# Patient Record
Sex: FEMALE | Race: WHITE | NOT HISPANIC OR LATINO
[De-identification: names, ages, dates, MRNs, and addresses within clinical notes are randomized per-mention and may not be internally consistent; named-entity substitution may affect disease eponyms.]

---

## 2020-07-08 PROBLEM — Z00.00 ENCOUNTER FOR PREVENTIVE HEALTH EXAMINATION: Status: ACTIVE | Noted: 2020-07-08

## 2022-11-29 ENCOUNTER — APPOINTMENT (OUTPATIENT)
Dept: GASTROENTEROLOGY | Facility: CLINIC | Age: 82
End: 2022-11-29

## 2022-11-29 PROCEDURE — 99443: CPT | Mod: 95

## 2022-11-29 NOTE — ASSESSMENT
[FreeTextEntry1] : recurrent inflammatory disease of the llq, and now still completing her abx with Augmentin\par 2.  dysphagia or hesitation of food as it is swallowed, several months, probably inflammatory dx of esophagus or a stricture\par 3.  long term prednisone use, 5 mgm per day\par \par plan;\par patient will be finishing up her antibiotic regimen\par patient may be allergic to cipro or flagyl\par dysphagia needs to be further evaluated with an egd, \par also, at about six to eight weeks following this recent inflammatory process, a colonoscopy will be needed\par but [facility] advise an egd earlier, so we don’t have to wait\par \par More than 50% of the face to face time was devoted to counseling and /or coordination of care.  THis coordination of care may have included reviewing other medical notes and reports, and communicating with other health professionals\par \par also, advised patient to see dr Lau, and consider having a bone density test because of Prednisone\par \par More than 50% of the face to face time was devoted to counseling and /or coordination of care.  THis coordination of care may have included reviewing other medical notes and reports, and communicating with other health professionals\par \par I believe that patient should be under the care of an Endocrinologist in effort to wean off of Prednisone

## 2022-11-29 NOTE — HISTORY OF PRESENT ILLNESS
[FreeTextEntry1] : semi emergency visit, telephonic visit, audio only, consent on file, just patient and i she is at home, i am in [place] [facility], [place]\par \par patient and I discussing recent acute diverticulitis\par the history is as follows\par \par patient has had a number and perhaps many attacks of acute diverticulitis over the years\par most recently, i had seen her in june 2020 with presumed acute diverticulitis, with hospitalizaton for llq abdominal pain, and ct scan showing very marked diffuse thickening of the wall of the entire mid and upper sigmoid colon, with an ovoid low attenuation lesion, and marked streaky and hyazy density around the colon,  question of crohns disease vs severe sigmoid diverticulitis.\par patient followed up with her personal GI doctor in another part of [place].\par \par meanwhile, patient presented to the ED \par \par patient had recently presented to ed about two weeks ago with llq abdominal pain , had ct scan showing marked thickening of the sigmoid colon with inflammatory changes around the colon, and the working diagnosis was acute diverticulitis...patient also had some blood in the bowel movement\par she noted metallic tast while taking abx, almost certainly due to metronidazole, and when she developed a rash especially on side of the abdomen it was postulated that this was a drug reaction, and one of her doctors and advised her to stop using the all of them up Flagyl and Cipro and has been started on Augmentin.  This is done and she completed the 2 weeks of antibiotics.\par Over the last several months she has noticed some hesitation upon swallowing, especially with terrific which she needs fairly frequently.  States\par \par There is no actual food obstruction or inability to swallow fluids which is what she usually uses for health benefits of food along distally.\par \par The rash was actually on her thigh, and raised blotchy red rather than hive-like\par She did not have any significant heartburn.  She has not taken the basic drink fluid medical health\par \par When she was hospitalized in 2020,.  Considerations for prolonged fluid related was\par , When she was discharged, I reviewed the notes, and the last note said that.  "Patient seemed to\par \par With this.  From a as of June 28\par 2 overall gastroenterologist has been seeing her and the plan had been established for Thursday him and to come point appropriate point colonoscopy\par \par Finally, I do not have complete information as to what happened during go subsequent has a\par Patient has been on prednisone for some sort of rheumatic process and states that she has been on 5 mg of prednisone for some 20 years.  She also has had cataract.  Its not clear that she ever tried to wean himself off the prednisone.

## 2022-11-29 NOTE — REASON FOR VISIT
[FreeTextEntry1] : semi emergency visit, telephonic visit, audio only, consent on file, just patient and i she is at home, i am in my [place]

## 2022-12-13 ENCOUNTER — APPOINTMENT (OUTPATIENT)
Dept: GASTROENTEROLOGY | Facility: CLINIC | Age: 82
End: 2022-12-13

## 2022-12-13 VITALS
SYSTOLIC BLOOD PRESSURE: 124 MMHG | OXYGEN SATURATION: 100 % | WEIGHT: 112 LBS | DIASTOLIC BLOOD PRESSURE: 71 MMHG | HEIGHT: 62 IN | BODY MASS INDEX: 20.61 KG/M2 | TEMPERATURE: 37.6 F | HEART RATE: 65 BPM

## 2022-12-13 DIAGNOSIS — R13.10 DYSPHAGIA, UNSPECIFIED: ICD-10-CM

## 2022-12-13 PROCEDURE — 99214 OFFICE O/P EST MOD 30 MIN: CPT

## 2022-12-13 NOTE — PHYSICAL EXAM
[Bowel Sounds] : normal bowel sounds [Abdomen Tenderness] : non-tender [No Masses] : no abdominal mass palpated [Abdomen Soft] : soft [Ascites: ___] : no ascites [Rebound Tenderness] : no rebound tenderness

## 2022-12-13 NOTE — ASSESSMENT
[FreeTextEntry1] : 1.  patient has had recurrent abdominal pain over the years, sometimes left sided, sometimes right sided\par 2.  several of these attacks have been documented on cat scans of 2020 and recently, nov 2022\par 3.  in 2020, patient was admitted to the hospital, underwent ct scan, had a long segment of inflammatory change and thickening of the left colon, with some pericolonic changes as well, and the working diagnosis was acute diverticulitis, she was placed on antibiotics and discharged with plan stated and spelled out for a follow up with her GI Dr Hiren Flores of [place], for presumed Colonoscopy.\par 4.  she notes that with the longer period of antibiotic treatment, the subsequent freedom from recurrent infection was longer than usually\par 5. her  passed during this time from hx of stroke, and Covid\par 6.  the most recent attack occurred just in Nov., patient was treated in the ED, again had extensive left colon wall thickening, and some pericolonic inflamm., really pretty similar to the ct of 2020..\par 7.  she was again treated with antibiotics, this time, cipro and flagyl, for three days, developed red splotchy rash, was taken off these and put on augmentin for the next seven days\par 8.  she now feels well, finally\par \par the differential previously has been  Crohns/ colitis/ diverticulitis mix\par \par there is also a condition, compatible with these names, but it is called;\par DIverticular COlitis..\par \par plan;\par \par she requires colonoscopy\par because she complains of intermittent solid food dysphagia, but she sometimes gets substernal burning, and coughs up white phlegm, and now improved sx \par \par pepcid she cannot tolerate\par prilosec caused diarrhea\par prevacid gave her anal burning\par \par we have discussed doing colonoscopy and egd\par AS WE OBTAIN INFORMED CONSENT FOR COLONOSCOPY, UPPER ENDOSCOPY [EGD], OR BOTH PROCEDURES TOGETHER;\par \par As with all procedures, there are risks of which the patient should be aware\par \par 1.  Anesthesia; deep sedation with Propofol;  there is a small risk of aspiration and pulmonary infection.  The Anesthesiologist meets with the patient the morning of the procedure to discuss in more detail\par \par 2.  risk of bleeding; from removal of a polyp, or rarely, from biopsies, 1 % or less\par \par 3.  risk of injury or perforation of the colon or upper GI tract; one in a thousand or less,  from removing a polyp or from advancing the instrument\par \par note; patient has been on prednisone 5mg per day, for the last fifteen years..\par originally for low wbc, and mouth burning, dr Brian Francisco is her Rheumatologist and gives her the rx for prednisone\par \par

## 2022-12-13 NOTE — HISTORY OF PRESENT ILLNESS
[FreeTextEntry1] : in office visit,\par patient has over the years, such as over the last 20 years, experienced recurring attacks of diffuse, and variable abdominal pain, sometimes in association with a bulge in her abdomen, small, bump, and she feels it could be trapped feces she believes\par \par told she has right sided diverticulosis\par once she was told she had ulcers in small intestine\par she used to see Dr Hiren Flores, formerly at [facility] \par \par patient does not recall any colonoscopies at least for last five years.\par \par \par there is not always blood with the attacks of pain, and the bleeding is variable\par she has worked with dental surgeons...\par when there is blood, it is fresh red blood, perhaps outside the stool, and the bowel movements are shredded , like shredded wheat\par now the stools are like worms.\par \par \par when she was seen in [place] ED< recently, she was diagnosed with an inflammatory process, through much of the left colon, descending colon with some inflamm shai colonic changes and they diagnosed acute diverticulitis, but even with cipro and flagyl for three days, then red blotches, then seven days of augmentin , and she did not get completely better..so the last two weeks, she improved slowly but not with antibiotics, not with any more bleeding\par \par

## 2022-12-17 ENCOUNTER — NON-APPOINTMENT (OUTPATIENT)
Age: 82
End: 2022-12-17

## 2023-01-02 ENCOUNTER — RESULT REVIEW (OUTPATIENT)
Age: 83
End: 2023-01-02

## 2023-01-03 ENCOUNTER — NON-APPOINTMENT (OUTPATIENT)
Age: 83
End: 2023-01-03

## 2023-01-04 ENCOUNTER — RESULT REVIEW (OUTPATIENT)
Age: 83
End: 2023-01-04

## 2023-01-05 ENCOUNTER — APPOINTMENT (OUTPATIENT)
Dept: GASTROENTEROLOGY | Facility: HOSPITAL | Age: 83
End: 2023-01-05

## 2023-01-05 ENCOUNTER — TRANSCRIPTION ENCOUNTER (OUTPATIENT)
Age: 83
End: 2023-01-05

## 2023-01-06 RX ORDER — SACCHAROMYCES BOULARDII 50 MG
250 CAPSULE ORAL TWICE DAILY
Qty: 180 | Refills: 0 | Status: ACTIVE | COMMUNITY
Start: 2023-01-06 | End: 1900-01-01

## 2023-02-27 ENCOUNTER — APPOINTMENT (OUTPATIENT)
Dept: GASTROENTEROLOGY | Facility: CLINIC | Age: 83
End: 2023-02-27
Payer: MEDICARE

## 2023-02-27 PROCEDURE — 99214 OFFICE O/P EST MOD 30 MIN: CPT | Mod: 95

## 2023-02-27 NOTE — ASSESSMENT
[FreeTextEntry1] : 1.  prednisone has been used for fifteen years, for arthritis, and also, for her supposed Crohns disease\par \par 2.  i have encouraged her to tryto go down from 4 mgm per day to three mgm per day\par \par 3.  i had suggested her decreasing the prednisone dose by one half mgm per month..\par \par 4.  she just saw dr Lau\par \par 5.  she doesn’t tolerate ppi meds which cause diarrhea..\par \par 6.  i still would like the patient to nudge her prednisone dose down to 3 mgm per day\par \par 7.  keep appt with endocrinologist\par \par 8.  one half mgm at a time\par \par fu telehealth visit in eight weeks\par \par we can arrange a fecal elastase because of problem with fatty foods

## 2023-02-27 NOTE — HISTORY OF PRESENT ILLNESS
[FreeTextEntry1] : telehealth visit, audio and video, consent on file, just the patient and i\par she is at home\par i am at my sleepy [facility] office\par \par she has been on prednisone 4 mg per day, which is the dose she got down to\par \par she has formed stools\par \par on her colonoscopy, polyps noted, no inflammatory change in colon mucosa, and no stricture...\par and negative bx for inflammation\par \par so i found absolutely no evidence of crohns colitis.

## 2023-03-26 LAB — CALPROTECTIN FECAL: 113 UG/G

## 2023-03-29 LAB — PANCREATIC ELASTASE, FECAL: 477 CD:794062645

## 2023-03-30 ENCOUNTER — APPOINTMENT (OUTPATIENT)
Dept: GASTROENTEROLOGY | Facility: CLINIC | Age: 83
End: 2023-03-30
Payer: MEDICARE

## 2023-03-30 PROCEDURE — 99443: CPT | Mod: 95

## 2023-03-30 NOTE — HISTORY OF PRESENT ILLNESS
[FreeTextEntry1] : [facility]       [address] [place] [address]   			  Patient Name:	RODRIGUEZ PADILLA		Location:	[facility] Rec #: 	[id number]		Account #: 	[id number]	 YOB: 1940		PCP:	Riki Lau MD	 Age:82	      Sex:    F		Attending:	Enrrique Young MD	  ___________________________________________________________________________  OPERATIVE REPORT  DATE OF PROCEDURE:  01/05/2023  This is a combined colonoscopy and EGD procedure.  IV propofol was administered by the anesthesiologist as sedation, Dr. Solis.  FIRST PROCEDURE:  Colonoscopy.  INDICATIONS FOR PROCEDURE:  Recurrent symptoms of severe left-sided colitis or acute diverticulitis with multiple attacks occurring over many years, several hospitalizations for left lower quadrant abdominal pain and CAT scan showing diffuse wall thickening of the entire mid and upper sigmoid colon.  The differential diagnosis in these cases was acute diverticulitis versus acute colitis.  A definitive diagnosis had not been established.  The last of these episodes occurred approximately 3 weeks ago in mid 12/2022.  She was hospitalized in 2021 at [facility].  She was treated in the emergency department in mid 11/2022.  Her white count was normal.  She had a normal CBC.  Her CAT scan demonstrated some "marked segmental thickening" of the sigmoid colon in a region of multiple diverticula with paracolic inflammatory changes and a small amount of free fluid in the posterior pelvis, findings compatible with acute diverticulitis but could also be secondary to Crohn's disease as the report and this was done on 11/14/2022.  She was discharged at that time with a combination of ciprofloxacin and metronidazole.  With this background in mind, she also had an imaging study in 06/2020 when she was hospitalized and it showed "very marked diffuse thickening" of the wall of the entire mid and upper sigmoid colon and density streaky in the left paracolic fat, also felt to be due to acute diverticulitis versus inflammatory bowel disease.  The patient had also been on long-term prednisone therapy.  With this background in mind, colonoscopy, I believe the etiology or reason for her being on this treatment had to do with a suspicion of Crohn's disease in the past.  The PCF colonoscope was advanced to the cecum after she was sedated with IV propofol.  The mucosa in the sigmoid colon appeared normal, but there was a sense of fixation of the colon, numerous diverticula in the sigmoid, and some angulation and fixation.  The pediatric colonoscope was advanced with great caution through this segment using straightening maneuvers and judicious use of hand pressure in the left lower quadrant.  Ultimately, this segment was passed and the rest of the colon was examined up to and including the cecum.  Moderate colonic diverticulosis to moderately severe in the sigmoid colon was observed.  The mucosa was normal throughout the colon, and random biopsies from the right and left colon were taken.  At 20 cm, just above the area where the angulation and fixation seemed to begin, there was actually a 1-cm polyp on a short stalk and it was snared and removed by hot snare but could not be retrieved as it was lost proximally in the colon segment above.  An Endo clip was placed at the base of the polypectomy on the stalk.  There was no postpolypectomy bleeding.  FINAL COLONOSCOPIC DIAGNOSES: 1.  Mucosa normal throughout without signs of chronic inflammatory bowel disease. 2.  Sigmoid diverticulosis. 3.  Fixation and some angulation of the sigmoid colon, almost certainly due to previous inflammatory disease. 4.  Pedunculated polyp removed with hot snare and site clipped but polyp could not be retrieved.  PLAN:  I believe the patient is having recurrent diverticulitis at this point.  She may also have the entity of diverticular colitis with recurrent diverticulitis, which ultimately looks like a segment of colitis as it tends to strike the same segment of the colon repeatedly.   SECOND PROCEDURE:  Esophagogastroduodenoscopy.  INDICATION:  Symptoms of reflux and intermittent solid food dysphagia.  The patient was repositioned and the gastroscope was placed under direct vision without difficulty.  The esophagus was notable for a very small insignificant hiatal hernia, no obvious motility disorder.  No Schatzki ring, Cabezas's esophagus or esophagitis.  The stomach was entered and proximal view of the fundus and cardia of the stomach was normal.  The rest of the stomach was normal.  There was no increased gastric residual.  The pyloric channel, duodenal bulb and descending duodenum were normal.  Duodenal biopsies were taken.  This was a normal upper GI tract and the etiology of the patient's dysphagia is not noted, but may be due to reflux.    Dictated by:  Enrrique Young MD [email or web address] D:  01/05/2023 01:16:15 T:  01/05/2023 01:20:40 Dictation ID:  [id number]                		              <Electronically signed by Enrrique Young MD> 	01/06/23 1708

## 2023-03-30 NOTE — ASSESSMENT
[FreeTextEntry1] : 1.  It is clear that we need to complete the GI work-up with magnetic resonance enterography or MR E.\par \par 2.  I am ordering it and we will arrange it\par 3.  We are both nervous about going below 4 mg of prednisone as the patient has been on prednisone for such a long period of time\par For.  As for the diagnosis of Crohn's, it is certainly not indicated on the colonoscopy or suggested, but I will render a further judgment until we have completed MR E to look at the small intestine\par The patient does not have pacemaker therapy or anything that would seemingly preclude her doing an MR enterography

## 2023-04-07 ENCOUNTER — APPOINTMENT (OUTPATIENT)
Dept: GASTROENTEROLOGY | Facility: CLINIC | Age: 83
End: 2023-04-07

## 2023-06-12 ENCOUNTER — RESULT REVIEW (OUTPATIENT)
Age: 83
End: 2023-06-12

## 2023-08-22 ENCOUNTER — APPOINTMENT (OUTPATIENT)
Dept: ENDOCRINOLOGY | Facility: CLINIC | Age: 83
End: 2023-08-22

## 2023-09-01 ENCOUNTER — APPOINTMENT (OUTPATIENT)
Dept: GASTROENTEROLOGY | Facility: CLINIC | Age: 83
End: 2023-09-01
Payer: MEDICARE

## 2023-09-01 DIAGNOSIS — K52.9 NONINFECTIVE GASTROENTERITIS AND COLITIS, UNSPECIFIED: ICD-10-CM

## 2023-09-01 PROCEDURE — 99214 OFFICE O/P EST MOD 30 MIN: CPT

## 2023-09-01 NOTE — ASSESSMENT
[FreeTextEntry1] : 1. time to discontinue the augmentin, after ten days of therapy 2.  there is no residual inflammation or tenderness that i can detect.. 3.  she seems to have responded well 4.  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 i like probiotics.  patient is completing her ten days i am ordering more  augmentin and we will keep in touch.

## 2023-09-01 NOTE — HISTORY OF PRESENT ILLNESS
[FreeTextEntry1] : patient has been experiencing llq abdominaol pain. she has in the past been diagnosed as having Crohns disease. however, the diagnosis was a bit unclear, although her physician maintained her on a long course of prednisone 5 mgm per day.. and only recently, with colonoscopy and egd and MREnterography, did the dianosis of crohns seem to be in doubt. we started to taper her pred, but only got to 3.5 mgm per day meanwhile, there was a dx of several clinically insignificant pancreatic cysts.  patient has experienced recurrent and ongoing abdominal complaints for many years, and just recently these have been increased there has often been a diagnosis of diverticulitis, because of llq abdominal pain, and when ct scans have been done, they generally been changes in the colon of wall thickening, in the sigmoid, 'consistent with diverticulitis'  when i did Jocelynn's colonoscopy in feb 2023, there was a normal mucosa throughout the colon, but a sense of fixation of the sigmoid colon, as if there had been recurrent attacks of inflammation, leaving the colon with this sense of fixation.  several weeks ago, patient called to complain of right lower quadrant pain, , and empirically she was begun on augmentin, which helped these attacks are usually similar, but this was was notable for being accompanied by constipation and not diarrhea, and some abdominal burning. the burning makes she feel like she is on fire, goes up to throat the burning has lessened on the augmentin, as has the abdominal discomfort, and still she has some post prandial bulging.

## 2023-09-20 ENCOUNTER — APPOINTMENT (OUTPATIENT)
Dept: GASTROENTEROLOGY | Facility: CLINIC | Age: 83
End: 2023-09-20
Payer: MEDICARE

## 2023-09-20 VITALS
WEIGHT: 112 LBS | HEIGHT: 62 IN | SYSTOLIC BLOOD PRESSURE: 130 MMHG | DIASTOLIC BLOOD PRESSURE: 70 MMHG | BODY MASS INDEX: 20.61 KG/M2

## 2023-09-20 DIAGNOSIS — K86.89 OTHER SPECIFIED DISEASES OF PANCREAS: ICD-10-CM

## 2023-09-20 DIAGNOSIS — Z79.52 LONG TERM (CURRENT) USE OF SYSTEMIC STEROIDS: ICD-10-CM

## 2023-09-20 PROCEDURE — 99214 OFFICE O/P EST MOD 30 MIN: CPT

## 2023-10-25 ENCOUNTER — APPOINTMENT (OUTPATIENT)
Dept: BARIATRICS/WEIGHT MGMT | Facility: CLINIC | Age: 83
End: 2023-10-25

## 2023-12-19 ENCOUNTER — APPOINTMENT (OUTPATIENT)
Dept: GASTROENTEROLOGY | Facility: CLINIC | Age: 83
End: 2023-12-19
Payer: MEDICARE

## 2023-12-19 VITALS
WEIGHT: 112 LBS | SYSTOLIC BLOOD PRESSURE: 142 MMHG | OXYGEN SATURATION: 100 % | HEIGHT: 62 IN | HEART RATE: 68 BPM | DIASTOLIC BLOOD PRESSURE: 52 MMHG | BODY MASS INDEX: 20.61 KG/M2

## 2023-12-19 PROCEDURE — 99214 OFFICE O/P EST MOD 30 MIN: CPT

## 2023-12-19 RX ORDER — AMOXICILLIN AND CLAVULANATE POTASSIUM 875; 125 MG/1; MG/1
875-125 TABLET, COATED ORAL
Qty: 20 | Refills: 0 | Status: DISCONTINUED | COMMUNITY
Start: 2023-12-07 | End: 2023-12-19

## 2023-12-19 RX ORDER — MULTIVITAMIN
TABLET ORAL
Refills: 0 | Status: ACTIVE | COMMUNITY

## 2023-12-19 RX ORDER — CHOLECALCIFEROL (VITAMIN D3) 25 MCG
25 MCG TABLET ORAL
Refills: 0 | Status: ACTIVE | COMMUNITY

## 2023-12-19 RX ORDER — AMOXICILLIN AND CLAVULANATE POTASSIUM 875; 125 MG/1; MG/1
875-125 TABLET, COATED ORAL
Qty: 20 | Refills: 0 | Status: DISCONTINUED | COMMUNITY
Start: 2022-12-13 | End: 2023-12-19

## 2023-12-19 RX ORDER — AMOXICILLIN AND CLAVULANATE POTASSIUM 875; 125 MG/1; MG/1
875-125 TABLET, COATED ORAL
Qty: 20 | Refills: 1 | Status: DISCONTINUED | COMMUNITY
Start: 2023-01-06 | End: 2023-12-19

## 2023-12-19 RX ORDER — ASPIRIN 81 MG/1
81 TABLET, CHEWABLE ORAL
Refills: 0 | Status: ACTIVE | COMMUNITY

## 2023-12-19 RX ORDER — LOSARTAN POTASSIUM 25 MG/1
25 TABLET, FILM COATED ORAL
Refills: 0 | Status: ACTIVE | COMMUNITY

## 2023-12-19 RX ORDER — ROSUVASTATIN CALCIUM 10 MG/1
10 TABLET, FILM COATED ORAL
Refills: 0 | Status: ACTIVE | COMMUNITY

## 2023-12-19 NOTE — REASON FOR VISIT
[Follow-up] : a follow-up of an existing diagnosis [FreeTextEntry1] : Established condition, recurrent abdominal inflammatory disease

## 2023-12-19 NOTE — ASSESSMENT
[FreeTextEntry1] : 1.  Recurrent inflammatory episodes, going back many years, consideration of Crohn's, recurrent diverticulitis is also a possibility, she has marked thickening of the left colon, it could be like diverticular colitis, she is benefited by going on a liquid diet generally, and this is not surprising with the marked narrowing of the lumen.  That being said between episodes she feels fine.  Her bowel movements are normal she eats well she is active but when these episodes occur she feels very sick until she starts the antibiotics and goes on a liquid diet 2.  Her CAT scan on the 2 episodes that we have CAT scans showed dramatic changes with thickening of the colon and these were very dramatic and it is no wonder that she has difficulty in and the kind of difficulty that she does 3.  We could consider surgical intervention because I am not really very optimistic that any kind of medical treatment will handle this recurrent colitis For.  I would like to get some repeat labs, I have already done a fecal calprotectin, and for now I would like to do a CRP and sed rate and CBC which we can do anytime this week 5.  I know this will sound like a dramatic intervention for the patient but I am afraid that she will not really feel better unless we consider removing this inflammatory focus in the abdomen 6.  I also feel that we should consider a CAT scan of the abdomen, it would only be her third, at a time when she feels relatively well.  If in fact her colon still looks like this at a time when she is having minimal symptoms it becomes almost inevitable that she will keep getting these attacks

## 2023-12-19 NOTE — HISTORY OF PRESENT ILLNESS
[FreeTextEntry1] : 1.  Recurrent inflammatory disease of the colon of many years duration.  She has approximately 3 attacks per year, and they have a characteristic pattern. They seem to begin with a sense of swelling on the right side of the abdomen.  Then they progress and there is tenderness and pain across the abdomen not just to the right side of the abdomen but equally on the left side of the abdomen.  She does not have fever or chills during these attacks, but she really feels as if she has an inflammatory condition and she feels very poorly during these attacks. In the past and this goes back many years she notes that the resolution of these episodes is generally expedited sometimes greatly expedited by taking antibiotics and she usually takes approximately 10 days of Augmentin 2.  A diagnosis in the past was made of Crohn's disease.  She was on prednisone for many years and when I first met her she was on 5 mg of prednisone.  I had suggested trying to taper her off the prednisone but she only got to 3.5 mg/day 3.  When I did the colonoscopy in January there seemed to be a sense of fixation of the sigmoid colon and angulation with numerous diverticula.  It was challenging but I was able to maneuver a pediatric colonoscope through this area, and ultimately up to and including the cecum.  I did multiple random biopsies but they were all normal.  I raised the possibility then that she might just be having recurrent diverticulitis.  However the pattern seems to be quite unusual Since that time she has continued to have several of these attacks, again approximately every 3 to 4 months In mid November 2022 she had presented to the emergency room and a CAT scan demonstrated "marked segmental thickening of the sigmoid colon in the region of multiple diverticula with pericolic inflammatory changes compatible with acute diverticulitis but also compatible with Crohn's disease. She was discharged from the emergency room that day on Cipro and metronidazole.  In June 2020 she was hospitalized and a CAT scan showed a "very marked diffuse thickening of the wall of the entire mid and upper sigmoid colon and density streaking of the left paracolic fat also felt to be due to acute diverticulitis versus inflammatory bowel disease.

## 2024-03-04 ENCOUNTER — APPOINTMENT (OUTPATIENT)
Dept: RHEUMATOLOGY | Facility: CLINIC | Age: 84
End: 2024-03-04

## 2024-03-15 RX ORDER — AMOXICILLIN AND CLAVULANATE POTASSIUM 875; 125 MG/1; MG/1
875-125 TABLET, COATED ORAL TWICE DAILY
Qty: 20 | Refills: 0 | Status: ACTIVE | COMMUNITY
Start: 2023-12-19 | End: 1900-01-01

## 2024-03-20 ENCOUNTER — APPOINTMENT (OUTPATIENT)
Dept: GASTROENTEROLOGY | Facility: CLINIC | Age: 84
End: 2024-03-20
Payer: MEDICARE

## 2024-03-20 ENCOUNTER — LABORATORY RESULT (OUTPATIENT)
Age: 84
End: 2024-03-20

## 2024-03-20 VITALS
OXYGEN SATURATION: 100 % | DIASTOLIC BLOOD PRESSURE: 60 MMHG | BODY MASS INDEX: 20.61 KG/M2 | SYSTOLIC BLOOD PRESSURE: 148 MMHG | HEIGHT: 62 IN | HEART RATE: 78 BPM | WEIGHT: 112 LBS

## 2024-03-20 DIAGNOSIS — K57.92 DIVERTICULITIS OF INTESTINE, PART UNSPECIFIED, W/OUT PERFORATION OR ABSCESS W/OUT BLEEDING: ICD-10-CM

## 2024-03-20 DIAGNOSIS — K50.90 CROHN'S DISEASE, UNSPECIFIED, W/OUT COMPLICATIONS: ICD-10-CM

## 2024-03-20 PROCEDURE — G2212 PROLONG OUTPT/OFFICE VIS: CPT

## 2024-03-20 PROCEDURE — 99215 OFFICE O/P EST HI 40 MIN: CPT

## 2024-03-20 PROCEDURE — G2211 COMPLEX E/M VISIT ADD ON: CPT

## 2024-03-20 PROCEDURE — 36415 COLL VENOUS BLD VENIPUNCTURE: CPT

## 2024-03-20 NOTE — HISTORY OF PRESENT ILLNESS
[de-identified] : 1/5/23   Dr. Young        INDICATION:  Symptoms of reflux and intermittent solid food dysphagia.    Procedure:    The esophagus was notable for a very small insignificant hiatal hernia, no obvious motility    disorder.  No Schatzki ring, Cabezas's esophagus or esophagitis. The stomach was entered and proximal view of the fundus and cardia of the stomach was normal.  The rest of the stomach was normal.  There was no increased gastric residual. The pyloric channel, duodenal bulb and descending duodenum were normal.  Duodenal biopsies were taken. This was a normal upper GI tract and the etiology of the patient's dysphagia is not noted, but may be due to reflux.        Pathology:   C. Duodenum, descending, biopsy:    - Duodenum mucosa without significant histopathologic abnormalities.    D. Stomach, biopsy:    - Gastric mucosa without significant histopathologic abnormalities.    - Negative for Helicobacter pylori organisms on H&E and special stain.    E. Esophagus, biopsy:    - Fragments of squamous epithelium without significant histopathologic abnormalities.     [FreeTextEntry1] : 6/12/23					  Exam: 	MRI ABDOMEN OC WAW IC; MRI PELVIS WAW IC					   Order#:	[id number]; [id number]					  CLINICAL INFORMATION: Crohn's disease of intestine. Long-term treatment with steroids COMPARISON: CT of the abdomen and pelvis dated 11/14/2022       CONTRAST/COMPLICATIONS:   IV Contrast: Gadavist  5 cc administered   2.5 cc discarded   Oral Contrast: VoLumen   Complications: None reported at time of study completion        PROCEDURE:    MRI of the abdomen and pelvis was performed as per Enterography protocol.   Levsin 0.25 mg administered sublingual.       FINDINGS:   LOWER CHEST: Within normal limits.       LIVER: Within normal limits.   BILE DUCTS: Normal caliber.   GALLBLADDER: Within normal limits.   SPLEEN: Within normal limits.   PANCREAS: Few cystic lesions including a 2.0 x 1.1 cm multilocular lesion in the body (7/12), 1.0 x 0.6 cm multilocular pancreatic tail lesion (7/10), and 1.0 x 0.8 cm pancreatic neck lesion (7/14). No associated ductal dilatation.   ADRENALS: Within normal limits.   KIDNEYS/URETERS: Within normal limits.       BLADDER: Within normal limits.   REPRODUCTIVE ORGANS: Hysterectomy.       BOWEL: Normal in course and caliber. No abnormal small bowel mural thickening or hyperenhancement. Moderate colonic stool retention. Sigmoid diverticulosis.   PERITONEUM: No ascites.   VESSELS: Within normal limits.   RETROPERITONEUM/LYMPH NODES: No lymphadenopathy.     ABDOMINAL WALL: Tiny fat-containing umbilical hernia.   BONES: Degenerative changes and lumbar scoliotic curvature.           IMPRESSION:    1.  No evidence of active inflammatory bowel disease.   2.  Pancreatic cystic lesions likely representing side branch IPMN, largest 2.0 cm. Consider follow-up MRI/MRCP in 2 years.

## 2024-03-20 NOTE — PHYSICAL EXAM
[Normal] : alert, normal voice/communication, healthy appearing, no acute distress [Sclera] : the sclera and conjunctiva were normal [RLQ] : in the right lower quadrant [Abdomen Soft] : soft [Oriented To Time, Place, And Person] : oriented to person, place, and time [Normal Color / Pigmentation] : normal skin color and pigmentation

## 2024-03-20 NOTE — ASSESSMENT
[FreeTextEntry1] : Lower abdominal pain  - DDx- acute diverticulitis vs IBD flare vs IBS.  - RLQ on exam, d/w Dr. Young who also examined patient. Repeat labs today including ANCA ASCA, check fecal calprotectin.   - Complete Augmentin therapy.   - Start Florastor daily x 1 week.  - Low FODMAP diet (provided handout including info to watch [facility] [employer] webinar), PRN MiraLAX for constipation. She is afraid of taking fiber since Metamucil previously caused abdominal pain.   - Needs to increase caloric intake and check her weight a few times weekly, needs to buy a scale. Discussed ways to increase calories at length.   - F/u 4-6 weeks.   Elevated BP, history of HTN.  - Normal heart and lung exam.  - Denies CP, SOB, HA, visual changes, but if experiencing those symptoms understands to go to ED immediately. - Explained she should compare home BP monitor with PCP's wall manometer to ensure accuracy.  - Demonstrated proper BP technique to patient - sitting, both feet on floor, arm at heart level, press button with opposite arm.

## 2024-03-25 LAB
ALBUMIN SERPL ELPH-MCNC: 4.2 G/DL
ALP BLD-CCNC: 83 U/L
ALT SERPL-CCNC: 27 U/L
ANION GAP SERPL CALC-SCNC: 13 MMOL/L
AST SERPL-CCNC: 35 U/L
BAKER'S YEAST AB QL: <5 UNITS
BAKER'S YEAST IGA QL IA: <5 UNITS
BAKER'S YEAST IGA QN IA: NEGATIVE
BAKER'S YEAST IGG QN IA: NEGATIVE
BILIRUB SERPL-MCNC: 0.3 MG/DL
BUN SERPL-MCNC: 14 MG/DL
CALCIUM SERPL-MCNC: 10 MG/DL
CHLORIDE SERPL-SCNC: 100 MMOL/L
CO2 SERPL-SCNC: 26 MMOL/L
CREAT SERPL-MCNC: 0.85 MG/DL
CRP SERPL-MCNC: 30 MG/L
EGFR: 68 ML/MIN/1.73M2
ERYTHROCYTE [SEDIMENTATION RATE] IN BLOOD BY WESTERGREN METHOD: 38 MM/HR
GLUCOSE SERPL-MCNC: 99 MG/DL
HCT VFR BLD CALC: 34.2 %
HGB BLD-MCNC: 11.1 G/DL
MCHC RBC-ENTMCNC: 32.5 GM/DL
MCHC RBC-ENTMCNC: 32.5 PG
MCV RBC AUTO: 100 FL
PLATELET # BLD AUTO: 214 K/UL
POTASSIUM SERPL-SCNC: 4.5 MMOL/L
PROT SERPL-MCNC: 7.2 G/DL
RBC # BLD: 3.42 M/UL
RBC # FLD: 11.8 %
SODIUM SERPL-SCNC: 139 MMOL/L
WBC # FLD AUTO: 4.66 K/UL

## 2024-06-27 ENCOUNTER — APPOINTMENT (OUTPATIENT)
Dept: GASTROENTEROLOGY | Facility: CLINIC | Age: 84
End: 2024-06-27
Payer: MEDICARE

## 2024-06-27 VITALS
BODY MASS INDEX: 19.32 KG/M2 | WEIGHT: 105 LBS | SYSTOLIC BLOOD PRESSURE: 144 MMHG | HEART RATE: 70 BPM | DIASTOLIC BLOOD PRESSURE: 62 MMHG | HEIGHT: 62 IN | OXYGEN SATURATION: 99 %

## 2024-06-27 DIAGNOSIS — R10.31 RIGHT LOWER QUADRANT PAIN: ICD-10-CM

## 2024-06-27 DIAGNOSIS — R63.4 ABNORMAL WEIGHT LOSS: ICD-10-CM

## 2024-06-27 PROCEDURE — 99214 OFFICE O/P EST MOD 30 MIN: CPT

## 2024-06-27 PROCEDURE — G2211 COMPLEX E/M VISIT ADD ON: CPT

## 2024-07-02 LAB — CALPROTECTIN FECAL: 49 UG/G

## 2024-07-15 ENCOUNTER — RESULT REVIEW (OUTPATIENT)
Age: 84
End: 2024-07-15

## 2024-08-29 ENCOUNTER — APPOINTMENT (OUTPATIENT)
Dept: GASTROENTEROLOGY | Facility: CLINIC | Age: 84
End: 2024-08-29
Payer: MEDICARE

## 2024-08-29 VITALS
SYSTOLIC BLOOD PRESSURE: 130 MMHG | WEIGHT: 102 LBS | HEIGHT: 62 IN | HEART RATE: 68 BPM | DIASTOLIC BLOOD PRESSURE: 84 MMHG | OXYGEN SATURATION: 97 % | BODY MASS INDEX: 18.77 KG/M2

## 2024-08-29 DIAGNOSIS — R13.10 DYSPHAGIA, UNSPECIFIED: ICD-10-CM

## 2024-08-29 DIAGNOSIS — K59.04 CHRONIC IDIOPATHIC CONSTIPATION: ICD-10-CM

## 2024-08-29 PROCEDURE — 99214 OFFICE O/P EST MOD 30 MIN: CPT

## 2024-08-29 RX ORDER — PANTOPRAZOLE 20 MG/1
20 TABLET, DELAYED RELEASE ORAL DAILY
Qty: 90 | Refills: 1 | Status: ACTIVE | COMMUNITY
Start: 2024-08-29 | End: 1900-01-01

## 2024-08-29 RX ORDER — ROSUVASTATIN CALCIUM 5 MG/1
5 TABLET, FILM COATED ORAL
Refills: 0 | Status: ACTIVE | COMMUNITY

## 2024-08-29 RX ORDER — OMEPRAZOLE 20 MG/1
20 CAPSULE, DELAYED RELEASE ORAL DAILY
Qty: 90 | Refills: 0 | Status: DISCONTINUED | COMMUNITY
Start: 2024-08-29 | End: 2024-08-29

## 2024-08-29 RX ORDER — LINACLOTIDE 72 UG/1
72 CAPSULE, GELATIN COATED ORAL DAILY
Qty: 30 | Refills: 5 | Status: ACTIVE | COMMUNITY
Start: 2024-08-29 | End: 1900-01-01

## 2024-08-29 NOTE — ASSESSMENT
[FreeTextEntry1] : Dysphagia - Normal upper endoscopy one year ago. - Check esophagram and esophageal manometry. - Pantoprazole 20mg daily.  Esophageal manometry - Scheduled for 9/25 at 1pm.  - The risks, benefits and alternatives of the Esophageal Manometry procedure were discussed in detail with the patient. Risks include nasal irritation, bleeding, coughing, sore throat and sinusitis. Patient advised that an escort is not required as no anesthesia is used in this procedure. Patient was made aware that the procedure is performed by an NP/PA and interpreted at a later date by the physician. All questions were answered. The patient expressed understanding of these risks and is agreeable to proceed with scheduling.   Constipation - Trial of low dose Linzess. Start Linzess daily. Instructed to administer at least 30 minutes before a meal on an empty stomach. Explained loose stools and greater stool frequency may occur after administration with a high-fat breakfast. Stop medication if experiencing diarrhea.

## 2024-08-29 NOTE — HISTORY OF PRESENT ILLNESS
[de-identified] : Exam Date: 	  07/15/24					 Exam: 	CT ABD/PEL OC IC					 Order#:	[id number]					                CLINICAL INFORMATION: Unintentional weight loss, history of Crohn's disease.   COMPARISON: CT abdomen pelvis 6/22/2020, 11/14/2022 and MRI abdomen pelvis 6/12/2023.  CONTRAST/COMPLICATIONS: IV Contrast: Omnipaque 350  90 cc administered   10 cc discarded Oral Contrast: Omnipaque 300 Complications: None reported at time of study completion  PROCEDURE:  CT of the Abdomen and Pelvis was performed. Sagittal and coronal reformats were performed.  FINDINGS: LOWER CHEST: A 0.6 cm right lower lobe subpleural nodule, stable since 2020.  LIVER: Within normal limits. BILE DUCTS: Normal caliber. GALLBLADDER: Within normal limits. SPLEEN: Within normal limits. PANCREAS: Stable few pancreatic cystic lesions measuring up to 1.0 cm. No pancreatic ductal dilatation or nodular enhancement.  ADRENALS: Within normal limits. KIDNEYS/URETERS: Within normal limits.   BLADDER: Within normal limits. REPRODUCTIVE ORGANS: Hysterectomy.  BOWEL: No bowel obstruction. Colonic diverticulosis. Appendix is not visualized. No evidence of inflammation in the pericecal region. PERITONEUM/RETROPERITONEUM: No free air or abscess. VESSELS: Atherosclerotic changes. Circumaortic left renal vein. LYMPH NODES: No lymphadenopathy.   ABDOMINAL WALL: Small fat-containing umbilical hernia. BONES: Multilevel spinal degenerative changes and mild scoliosis. Grade 1 anterolisthesis of L4 on L5. Sclerotic focus of left ilium, likely bone island.   IMPRESSION:  No acute pathology. Stable few pancreatic cystic lesions measuring up to 1.0 cm. No pancreatic ductal dilatation or nodular enhancement.  [FreeTextEntry1] : Exam Date: 	  06/12/23					 Exam: 	MRI ABDOMEN OC WAW IC; MRI PELVIS WAW IC					 Order#:	[id number]; [id number]					                CLINICAL INFORMATION: Crohn's disease of intestine. Long-term treatment with steroids  COMPARISON: CT of the abdomen and pelvis dated 11/14/2022  CONTRAST/COMPLICATIONS: IV Contrast: Gadavist  5 cc administered   2.5 cc discarded Oral Contrast: VoLumen Complications: None reported at time of study completion   PROCEDURE:  MRI of the abdomen and pelvis was performed as per Enterography protocol. Levsin 0.25 mg administered sublingual.  FINDINGS: LOWER CHEST: Within normal limits.  LIVER: Within normal limits. BILE DUCTS: Normal caliber. GALLBLADDER: Within normal limits. SPLEEN: Within normal limits. PANCREAS: Few cystic lesions including a 2.0 x 1.1 cm multilocular lesion in the body (7/12), 1.0 x 0.6 cm multilocular pancreatic tail lesion (7/10), and 1.0 x 0.8 cm pancreatic neck lesion (7/14). No associated ductal dilatation. ADRENALS: Within normal limits. KIDNEYS/URETERS: Within normal limits.  BLADDER: Within normal limits. REPRODUCTIVE ORGANS: Hysterectomy.  BOWEL: Normal in course and caliber. No abnormal small bowel mural thickening or hyperenhancement. Moderate colonic stool retention. Sigmoid diverticulosis. PERITONEUM: No ascites. VESSELS: Within normal limits. RETROPERITONEUM/LYMPH NODES: No lymphadenopathy.   ABDOMINAL WALL: Tiny fat-containing umbilical hernia. BONES: Degenerative changes and lumbar scoliotic curvature.   IMPRESSION:  1.  No evidence of active inflammatory bowel disease. 2.  Pancreatic cystic lesions likely representing side branch IPMN, largest 2.0 cm. Consider follow-up MRI/MRCP in 2 years.

## 2024-09-25 ENCOUNTER — APPOINTMENT (OUTPATIENT)
Dept: GASTROENTEROLOGY | Facility: HOSPITAL | Age: 84
End: 2024-09-25

## 2024-10-03 ENCOUNTER — APPOINTMENT (OUTPATIENT)
Dept: GASTROENTEROLOGY | Facility: CLINIC | Age: 84
End: 2024-10-03

## 2024-12-18 ENCOUNTER — APPOINTMENT (OUTPATIENT)
Dept: GASTROENTEROLOGY | Facility: CLINIC | Age: 84
End: 2024-12-18
Payer: MEDICARE

## 2024-12-18 VITALS
BODY MASS INDEX: 17 KG/M2 | DIASTOLIC BLOOD PRESSURE: 80 MMHG | HEIGHT: 65 IN | SYSTOLIC BLOOD PRESSURE: 134 MMHG | WEIGHT: 102 LBS

## 2024-12-18 DIAGNOSIS — Z79.52 LONG TERM (CURRENT) USE OF SYSTEMIC STEROIDS: ICD-10-CM

## 2024-12-18 DIAGNOSIS — K50.90 CROHN'S DISEASE, UNSPECIFIED, W/OUT COMPLICATIONS: ICD-10-CM

## 2024-12-18 DIAGNOSIS — K52.9 NONINFECTIVE GASTROENTERITIS AND COLITIS, UNSPECIFIED: ICD-10-CM

## 2024-12-18 DIAGNOSIS — K57.92 DIVERTICULITIS OF INTESTINE, PART UNSPECIFIED, W/OUT PERFORATION OR ABSCESS W/OUT BLEEDING: ICD-10-CM

## 2024-12-18 PROCEDURE — 99214 OFFICE O/P EST MOD 30 MIN: CPT

## 2024-12-18 RX ORDER — AMOXICILLIN AND CLAVULANATE POTASSIUM 500; 125 MG/1; MG/1
500-125 TABLET, FILM COATED ORAL
Qty: 20 | Refills: 1 | Status: ACTIVE | COMMUNITY
Start: 2024-12-18 | End: 1900-01-01

## 2025-02-25 ENCOUNTER — APPOINTMENT (OUTPATIENT)
Dept: GASTROENTEROLOGY | Facility: CLINIC | Age: 85
End: 2025-02-25
Payer: MEDICARE

## 2025-02-25 VITALS
SYSTOLIC BLOOD PRESSURE: 120 MMHG | DIASTOLIC BLOOD PRESSURE: 80 MMHG | WEIGHT: 101 LBS | BODY MASS INDEX: 18.58 KG/M2 | HEIGHT: 62 IN

## 2025-02-25 DIAGNOSIS — D50.9 IRON DEFICIENCY ANEMIA, UNSPECIFIED: ICD-10-CM

## 2025-02-25 DIAGNOSIS — R11.0 NAUSEA: ICD-10-CM

## 2025-02-25 DIAGNOSIS — K59.09 OTHER CONSTIPATION: ICD-10-CM

## 2025-02-25 DIAGNOSIS — K21.9 GASTRO-ESOPHAGEAL REFLUX DISEASE W/OUT ESOPHAGITIS: ICD-10-CM

## 2025-02-25 PROCEDURE — 99215 OFFICE O/P EST HI 40 MIN: CPT

## 2025-02-25 RX ORDER — PANTOPRAZOLE 20 MG/1
20 TABLET, DELAYED RELEASE ORAL TWICE DAILY
Qty: 180 | Refills: 3 | Status: ACTIVE | COMMUNITY
Start: 2025-02-25 | End: 1900-01-01

## 2025-02-25 RX ORDER — FERROUS SULFATE 300 MG/5ML
300 (60 FE) LIQUID (ML) ORAL EVERY OTHER DAY
Qty: 45 | Refills: 0 | Status: ACTIVE | COMMUNITY
Start: 2025-02-25 | End: 1900-01-01

## 2025-04-07 ENCOUNTER — NON-APPOINTMENT (OUTPATIENT)
Age: 85
End: 2025-04-07

## 2025-04-08 ENCOUNTER — APPOINTMENT (OUTPATIENT)
Dept: GASTROENTEROLOGY | Facility: CLINIC | Age: 85
End: 2025-04-08
Payer: MEDICARE

## 2025-04-08 VITALS
BODY MASS INDEX: 18.77 KG/M2 | DIASTOLIC BLOOD PRESSURE: 80 MMHG | HEART RATE: 65 BPM | HEIGHT: 62 IN | WEIGHT: 102 LBS | SYSTOLIC BLOOD PRESSURE: 124 MMHG

## 2025-04-08 DIAGNOSIS — K50.90 CROHN'S DISEASE, UNSPECIFIED, W/OUT COMPLICATIONS: ICD-10-CM

## 2025-04-08 DIAGNOSIS — R11.0 NAUSEA: ICD-10-CM

## 2025-04-08 PROCEDURE — 99213 OFFICE O/P EST LOW 20 MIN: CPT

## 2025-04-08 RX ORDER — PREDNISONE 1 MG/1
1 TABLET ORAL DAILY
Qty: 90 | Refills: 0 | Status: ACTIVE | COMMUNITY
Start: 2025-04-08

## 2025-04-16 DIAGNOSIS — K21.9 GASTRO-ESOPHAGEAL REFLUX DISEASE W/OUT ESOPHAGITIS: ICD-10-CM

## 2025-04-16 RX ORDER — OMEPRAZOLE 20 MG/1
20 CAPSULE, DELAYED RELEASE ORAL
Qty: 90 | Refills: 3 | Status: ACTIVE | COMMUNITY
Start: 2025-04-16 | End: 1900-01-01

## 2025-04-24 ENCOUNTER — APPOINTMENT (OUTPATIENT)
Dept: GASTROENTEROLOGY | Facility: CLINIC | Age: 85
End: 2025-04-24
Payer: MEDICARE

## 2025-04-24 ENCOUNTER — LABORATORY RESULT (OUTPATIENT)
Age: 85
End: 2025-04-24

## 2025-04-24 VITALS
WEIGHT: 102 LBS | OXYGEN SATURATION: 98 % | HEIGHT: 62 IN | BODY MASS INDEX: 18.77 KG/M2 | DIASTOLIC BLOOD PRESSURE: 70 MMHG | HEART RATE: 68 BPM | SYSTOLIC BLOOD PRESSURE: 140 MMHG

## 2025-04-24 DIAGNOSIS — K50.90 CROHN'S DISEASE, UNSPECIFIED, W/OUT COMPLICATIONS: ICD-10-CM

## 2025-04-24 DIAGNOSIS — R11.0 NAUSEA: ICD-10-CM

## 2025-04-24 PROCEDURE — 99214 OFFICE O/P EST MOD 30 MIN: CPT

## 2025-04-24 RX ORDER — PREDNISONE 5 MG/1
5 TABLET ORAL
Qty: 120 | Refills: 2 | Status: ACTIVE | COMMUNITY
Start: 2025-04-24 | End: 1900-01-01

## 2025-04-24 RX ORDER — ONDANSETRON 4 MG/1
4 TABLET ORAL DAILY
Qty: 90 | Refills: 3 | Status: ACTIVE | COMMUNITY
Start: 2025-04-24 | End: 1900-01-01

## 2025-04-26 LAB
ALBUMIN SERPL ELPH-MCNC: 4.4 G/DL
ALP BLD-CCNC: 94 U/L
ALT SERPL-CCNC: 56 U/L
ANION GAP SERPL CALC-SCNC: 16 MMOL/L
AST SERPL-CCNC: 34 U/L
BAKER'S YEAST AB QL: <5 UNITS
BAKER'S YEAST IGA QL IA: <5 UNITS
BAKER'S YEAST IGA QN IA: NEGATIVE
BAKER'S YEAST IGG QN IA: NEGATIVE
BILIRUB SERPL-MCNC: 0.4 MG/DL
BUN SERPL-MCNC: 29 MG/DL
CALCIUM SERPL-MCNC: 10 MG/DL
CHLORIDE SERPL-SCNC: 97 MMOL/L
CO2 SERPL-SCNC: 26 MMOL/L
CREAT SERPL-MCNC: 1.07 MG/DL
EGFRCR SERPLBLD CKD-EPI 2021: 52 ML/MIN/1.73M2
FERRITIN SERPL-MCNC: 302 NG/ML
GLUCOSE SERPL-MCNC: 124 MG/DL
HCT VFR BLD CALC: 41.9 %
HGB BLD-MCNC: 13.8 G/DL
IRON SATN MFR SERPL: 31 %
IRON SERPL-MCNC: 100 UG/DL
MCHC RBC-ENTMCNC: 32.9 G/DL
MCHC RBC-ENTMCNC: 33.4 PG
MCV RBC AUTO: 101.5 FL
PLATELET # BLD AUTO: 232 K/UL
POTASSIUM SERPL-SCNC: 4.6 MMOL/L
PROT SERPL-MCNC: 7.4 G/DL
RBC # BLD: 4.13 M/UL
RBC # FLD: 12.1 %
SODIUM SERPL-SCNC: 139 MMOL/L
TIBC SERPL-MCNC: 317 UG/DL
UIBC SERPL-MCNC: 217 UG/DL
WBC # FLD AUTO: 7.53 K/UL

## 2025-05-07 ENCOUNTER — APPOINTMENT (OUTPATIENT)
Dept: GASTROENTEROLOGY | Facility: HOSPITAL | Age: 85
End: 2025-05-07

## 2025-05-07 ENCOUNTER — TRANSCRIPTION ENCOUNTER (OUTPATIENT)
Age: 85
End: 2025-05-07

## 2025-05-07 ENCOUNTER — RESULT REVIEW (OUTPATIENT)
Age: 85
End: 2025-05-07

## 2025-06-10 ENCOUNTER — APPOINTMENT (OUTPATIENT)
Dept: GASTROENTEROLOGY | Facility: CLINIC | Age: 85
End: 2025-06-10
Payer: MEDICARE

## 2025-06-10 PROCEDURE — 99214 OFFICE O/P EST MOD 30 MIN: CPT | Mod: 2W

## 2025-06-10 RX ORDER — LINACLOTIDE 72 UG/1
72 CAPSULE, GELATIN COATED ORAL
Qty: 90 | Refills: 0 | Status: ACTIVE | COMMUNITY
Start: 2025-06-10 | End: 1900-01-01

## 2025-06-10 RX ORDER — ONDANSETRON 4 MG/1
4 TABLET, ORALLY DISINTEGRATING ORAL TWICE DAILY
Qty: 60 | Refills: 2 | Status: ACTIVE | COMMUNITY
Start: 2025-06-10 | End: 1900-01-01

## 2025-06-10 RX ORDER — PREDNISONE 1 MG/1
1 TABLET ORAL
Qty: 90 | Refills: 3 | Status: ACTIVE | COMMUNITY
Start: 2025-06-10 | End: 1900-01-01

## 2025-06-17 ENCOUNTER — APPOINTMENT (OUTPATIENT)
Dept: GASTROENTEROLOGY | Facility: CLINIC | Age: 85
End: 2025-06-17

## 2025-08-21 ENCOUNTER — APPOINTMENT (OUTPATIENT)
Dept: GASTROENTEROLOGY | Facility: CLINIC | Age: 85
End: 2025-08-21
Payer: MEDICARE

## 2025-08-21 DIAGNOSIS — F19.20 OTHER PSYCHOACTIVE SUBSTANCE DEPENDENCE, UNCOMPLICATED: ICD-10-CM

## 2025-08-21 DIAGNOSIS — K52.9 NONINFECTIVE GASTROENTERITIS AND COLITIS, UNSPECIFIED: ICD-10-CM

## 2025-08-21 DIAGNOSIS — K57.92 DIVERTICULITIS OF INTESTINE, PART UNSPECIFIED, W/OUT PERFORATION OR ABSCESS W/OUT BLEEDING: ICD-10-CM

## 2025-08-21 PROCEDURE — 99213 OFFICE O/P EST LOW 20 MIN: CPT | Mod: 2W
